# Patient Record
Sex: FEMALE | Race: ASIAN | NOT HISPANIC OR LATINO | Employment: FULL TIME | URBAN - METROPOLITAN AREA
[De-identification: names, ages, dates, MRNs, and addresses within clinical notes are randomized per-mention and may not be internally consistent; named-entity substitution may affect disease eponyms.]

---

## 2021-03-30 LAB
EXTERNAL CHLAMYDIA RESULT: NEGATIVE
EXTERNAL HIV SCREEN: NORMAL
HCV AB SER-ACNC: 0.2
N GONORRHOEA RRNA SPEC QL PROBE: NEGATIVE

## 2021-06-10 PROBLEM — N93.8 DUB (DYSFUNCTIONAL UTERINE BLEEDING): Status: ACTIVE | Noted: 2021-06-10

## 2021-06-11 ENCOUNTER — OFFICE VISIT (OUTPATIENT)
Dept: OBGYN CLINIC | Facility: CLINIC | Age: 36
End: 2021-06-11
Payer: COMMERCIAL

## 2021-06-11 VITALS
SYSTOLIC BLOOD PRESSURE: 110 MMHG | WEIGHT: 143 LBS | BODY MASS INDEX: 26.31 KG/M2 | HEIGHT: 62 IN | DIASTOLIC BLOOD PRESSURE: 68 MMHG

## 2021-06-11 DIAGNOSIS — N93.8 DUB (DYSFUNCTIONAL UTERINE BLEEDING): Primary | ICD-10-CM

## 2021-06-11 DIAGNOSIS — R53.83 FATIGUE, UNSPECIFIED TYPE: ICD-10-CM

## 2021-06-11 PROCEDURE — 99204 OFFICE O/P NEW MOD 45 MIN: CPT | Performed by: OBSTETRICS & GYNECOLOGY

## 2021-06-11 RX ORDER — LEVOTHYROXINE SODIUM 50 UG/1
2 CAPSULE ORAL DAILY
COMMUNITY

## 2021-06-11 NOTE — LETTER
June 17, 2021     Yajaira Doddridge,   [address] [facility]  [address]    Patient: Most Estefani Royal   YOB: 1985   Date of Visit: 6/11/2021       Dear Dr Mary Adrian:    Thank you for referring Most Estefani Royal to me for evaluation  Below are my notes for this consultation  If you have questions, please do not hesitate to call me  I look forward to following your patient along with you  Sincerely,        Torres Briscoe MD        CC: No Recipients  Torres Briscoe MD  6/17/2021  3:15 PM  Addendum  [id number]   [address] [facility] [address], [facility], [facility] [address]    Assessment/Plan:  1  DUB (dysfunctional uterine bleeding)  Comments:  Long history per pt and  starting after delivery of daughter in 2010  See HPI for details  Assessment & Plan:  I asked patient and  if their prior doctor had every discussed PCOS or done an ultrasound  They state many ultrasounds done and always normal   They are not familiar with PCOS  I briefly discussed PCOS and that this may be what she has  I would like to order some labs, but do not want to repeat a lot done by PCP  Will get PCP labs to review (release signed today)  Order given for pelvic ultrasound - recommend to do this next week  Once I see prior labs done I will likely order more  I recommend they return in 3-4 weeks to discuss findings and make a plan to manage periods and hopefully help them conceive in the future  They are appreciative of time spent and agree to plan  Orders:  -     US pelvis complete w transvaginal; Future; Expected date: 07/11/2021    2  Fatigue, unspecified type  Comments:  Patient with intermittent episodes of physical fatigue and sickness a couple times a week  Feels better after resting 1-2 days  Both her and  deny mental issues  A/P:   Will review labs for causes  She is only mildly anemic on ER labs  Subjective:   Most Estefani Royal is a 30 y o   No obstetric history on file  female  CC: follow up ER visit      HPI:    and her  present for f/u from ER for bleeding issues and discuss periods and interest in future pregnancy  They are both from [place], moving here a few months ago, and preferred language is [place]   speaks a little [place], while her , Preet Tomas, is more conversational   I offered them the use of an official  and they agreed  (I forgot to record the translators number, most of the conversation occurred with the )  Review of history through pt and  is that  had an uncomplicated vaginal delivery about 10 years ago  About 1-2 years after her delivery her periods became very irregular  Initially she was treated with OCPs, which helped normalize them but they were irregular after stopping them  Lab work done showed an abnormal thyroid testing and she was started on thyroid medicine about 6-7 years ago for hypothyroid  Even after treatment for her thyroid she continued to have irregular periods, sometimes missing many months and then getting them very heavy  Patient  states their gyn gave them a standing script for norethindrone 5mg  If  did not have a period for 3 months she was supposed to take 2 pills morning, afternoon and evening for 5 days and then would get a period  Alternatively if she had a period that was very heavy and wouldn't stop, she could take the pills once a day for 21 days and this would help the bleeding   states over the last year she has used the medication a couple times either to bring on a period or slow a heavy one  More missed periods than heavy periods per his recall  They have recently moved here and established PCP care with Dr Corrinne Shows  Apparently they recently had blood work done and have plans to f/u with Dr Enid Hogan to discuss the blood work    They feel her thyroid testing was normal but there was a question of "thalassemia" and they are to f/u in a couple months  I do not have access to this blood work today  Most's periods over the last 6 months have been as follows,  No period Jan or Feb, then March was a normal spontaneous period  April her period started 4/16/2021 light for two days and then very heavy  After not stopping she started the norethindrone 5mg PO on 4/29/2021 that she had from her gyn in [place]  She took it for 7 days but developed headache and little energy, so although her bleeding had stopped, she stopped taking the medication and the bleeding started very heavy again around 5/7/2021  She presented to the [facility] ER 5/9/2021 for the heavy bleeding  I am able to review the notes from the ER in [facility]  She was seen 5/9/2021 for vaginal bleeding  The history was similar to above and pt reported for heavy bleeding, feeling fatigued and dizzy  No abdominal pain or other symptoms  Labs showed stable hgb (10 4g/dl)  I did not see an ultrasound done  Her bleeding was not significant in the ER, so they were discharged home with at prescription for norethindrone EE 1 5/30mg-mcg  And told to f/u with gyn  I confirmed this with patient and   He states she did not take the medication from the ER and her menses stopped spontaneously  She has since had another period starting 6/2/2021 stopped 6/4/2021,  very light for her  She is currently not taking any progesterone or estrogen  Just her thyroid medication  They indicate that they are interested in having another child but this is difficult with irregular periods  Other than the hypothyroid issue, they state no other health problems but state occasionally Most has episodes where she feels sick, tired for a few days, needs to rest and then feels better    I questioned them both and they say this is strictly physical, not emotional or mental       Gyn History  Patient's last menstrual period was 2021  Last pap smear: Not on file    She  reports being sexually active and has had partner(s) who are Male  She reports using the following method of birth control/protection: None  OB History      Past Medical History:  No date: Hypothyroid     No past surgical history on file  Social History     Tobacco Use    Smoking status: Never Smoker    Smokeless tobacco: Never Used   Vaping Use    Vaping Use: Never used   Substance Use Topics    Alcohol use: Never    Drug use: Never          Current Outpatient Medications:     Levothyroxine Sodium 50 MCG CAPS, Take 2 capsules by mouth daily, Disp: , Rfl:     norethindrone (AYGESTIN) 5 mg tablet, Take 5 mg by mouth daily, Disp: , Rfl:  - not currently taking    She has No Known Allergies       ROS: Review of Systems   Constitutional: Positive for fatigue (intermittent)  Negative for chills and fever  Respiratory: Negative  Cardiovascular: Negative  Gastrointestinal: Negative  Genitourinary: Positive for menstrual problem (irregular and sometimes heavy prolonged bleeding)  Negative for pelvic pain  Psychiatric/Behavioral: Negative  Objective:  /68   Ht 5' 2" (1 575 m)   Wt 64 9 kg (143 lb)   LMP 2021 Comment: last menses lasted till 2021  Very light  Breastfeeding No   BMI 26 16 kg/m²      Physical Exam  Constitutional:       Appearance: Normal appearance  Neurological:      Mental Status: She is alert and oriented to person, place, and time     Psychiatric:         Behavior: Behavior normal

## 2021-06-11 NOTE — PROGRESS NOTES
[address], [place] [address]    Assessment/Plan:  1  DUB (dysfunctional uterine bleeding)  Comments:  Long history per pt and  starting after delivery of daughter in 2010  See HPI for details  Assessment & Plan:  I asked patient and  if their prior doctor had every discussed PCOS or done an ultrasound  They state many ultrasounds done and always normal   They are not familiar with PCOS  I briefly discussed PCOS and that this may be what she has  I would like to order some labs, but do not want to repeat a lot done by PCP  Will get PCP labs to review (release signed today)  Order given for pelvic ultrasound - recommend to do this next week  Once I see prior labs done I will likely order more  I recommend they return in 3-4 weeks to discuss findings and make a plan to manage periods and hopefully help them conceive in the future  They are appreciative of time spent and agree to plan  Orders:  -     US pelvis complete w transvaginal; Future; Expected date: 07/11/2021    2  Fatigue, unspecified type  Comments:  Patient with intermittent episodes of physical fatigue and sickness a couple times a week  Feels better after resting 1-2 days  Both her and  deny mental issues  A/P:   Will review labs for causes  She is only mildly anemic on ER labs  Subjective:   Most Carmela Night is a 30 y o  No obstetric history on file  female  CC: follow up ER visit      HPI:    and her  present for f/u from ER for bleeding issues and discuss periods and interest in future pregnancy  They are both from [place], moving here a few months ago, and preferred language is [place]  Most speaks a little [place], while her , Kike Olmstead, is more conversational   I offered them the use of an official  and they agreed    (I forgot to record the translators number, most of the conversation occurred with the )  Review of history through pt and  is that Most had an uncomplicated vaginal delivery about 10 years ago  About 1-2 years after her delivery her periods became very irregular  Initially she was treated with OCPs, which helped normalize them but they were irregular after stopping them  Lab work done showed an abnormal thyroid testing and she was started on thyroid medicine about 6-7 years ago for hypothyroid  Even after treatment for her thyroid she continued to have irregular periods, sometimes missing many months and then getting them very heavy  Patient  states their gyn gave them a standing script for norethindrone 5mg  If Most did not have a period for 3 months she was supposed to take 2 pills morning, afternoon and evening for 5 days and then would get a period  Alternatively if she had a period that was very heavy and wouldn't stop, she could take the pills once a day for 21 days and this would help the bleeding   states over the last year she has used the medication a couple times either to bring on a period or slow a heavy one  More missed periods than heavy periods per his recall  They have recently moved here and established PCP care with Dr Rehan Dixon  Apparently they recently had blood work done and have plans to f/u with Dr Karl Stephenson to discuss the blood work  They feel her thyroid testing was normal but there was a question of "thalassemia" and they are to f/u in a couple months  I do not have access to this blood work today  Most's periods over the last 6 months have been as follows,  No period Jan or Feb, then March was a normal spontaneous period  April her period started 4/16/2021 light for two days and then very heavy  After not stopping she started the norethindrone 5mg PO on 4/29/2021 that she had from her gyn in [place]    She took it for 7 days but developed headache and little energy, so although her bleeding had stopped, she stopped taking the medication and the bleeding started very heavy again around 2021  She presented to the [facility] ER 2021 for the heavy bleeding  I am able to review the notes from the ER in [facility]  She was seen 2021 for vaginal bleeding  The history was similar to above and pt reported for heavy bleeding, feeling fatigued and dizzy  No abdominal pain or other symptoms  Labs showed stable hgb (10 4g/dl)  I did not see an ultrasound done  Her bleeding was not significant in the ER, so they were discharged home with at prescription for norethindrone EE 1 5/30mg-mcg  And told to f/u with gyn  I confirmed this with patient and   He states she did not take the medication from the ER and her menses stopped spontaneously  She has since had another period starting 2021 stopped 2021,  very light for her  She is currently not taking any progesterone or estrogen  Just her thyroid medication  They indicate that they are interested in having another child but this is difficult with irregular periods  Other than the hypothyroid issue, they state no other health problems but state occasionally Most has episodes where she feels sick, tired for a few days, needs to rest and then feels better  I questioned them both and they say this is strictly physical, not emotional or mental       Gyn History  Patient's last menstrual period was 2021  Last pap smear: Not on file    She  reports being sexually active and has had partner(s) who are Male  She reports using the following method of birth control/protection: None  OB History      Past Medical History:  No date: Hypothyroid     No past surgical history on file       Social History     Tobacco Use    Smoking status: Never Smoker    Smokeless tobacco: Never Used   Vaping Use    Vaping Use: Never used   Substance Use Topics    Alcohol use: Never    Drug use: Never          Current Outpatient Medications:     Levothyroxine Sodium 50 MCG CAPS, Take 2 capsules by mouth daily, Disp: , Rfl:     norethindrone (AYGESTIN) 5 mg tablet, Take 5 mg by mouth daily, Disp: , Rfl:  - not currently taking    She has No Known Allergies       ROS: Review of Systems   Constitutional: Positive for fatigue (intermittent)  Negative for chills and fever  Respiratory: Negative  Cardiovascular: Negative  Gastrointestinal: Negative  Genitourinary: Positive for menstrual problem (irregular and sometimes heavy prolonged bleeding)  Negative for pelvic pain  Psychiatric/Behavioral: Negative  Objective:  /68   Ht 5' 2" (1 575 m)   Wt 64 9 kg (143 lb)   LMP 06/02/2021 Comment: last menses lasted till 6/4/2021  Very light  Breastfeeding No   BMI 26 16 kg/m²      Physical Exam  Constitutional:       Appearance: Normal appearance  Neurological:      Mental Status: She is alert and oriented to person, place, and time     Psychiatric:         Behavior: Behavior normal

## 2021-06-17 PROBLEM — R53.83 FATIGUE: Status: ACTIVE | Noted: 2021-06-17

## 2021-06-17 NOTE — ASSESSMENT & PLAN NOTE
I asked patient and  if their prior doctor had every discussed PCOS or done an ultrasound  They state many ultrasounds done and always normal   They are not familiar with PCOS  I briefly discussed PCOS and that this may be what she has  I would like to order some labs, but do not want to repeat a lot done by PCP  Will get PCP labs to review (release signed today)  Order given for pelvic ultrasound - recommend to do this next week  Once I see prior labs done I will likely order more  I recommend they return in 3-4 weeks to discuss findings and make a plan to manage periods and hopefully help them conceive in the future  They are appreciative of time spent and agree to plan

## 2021-07-09 ENCOUNTER — OFFICE VISIT (OUTPATIENT)
Dept: OBGYN CLINIC | Facility: CLINIC | Age: 36
End: 2021-07-09
Payer: COMMERCIAL

## 2021-07-09 VITALS
BODY MASS INDEX: 26.76 KG/M2 | HEIGHT: 62 IN | WEIGHT: 145.4 LBS | DIASTOLIC BLOOD PRESSURE: 64 MMHG | SYSTOLIC BLOOD PRESSURE: 102 MMHG

## 2021-07-09 DIAGNOSIS — N97.9 INFERTILITY, FEMALE: ICD-10-CM

## 2021-07-09 DIAGNOSIS — N93.8 DUB (DYSFUNCTIONAL UTERINE BLEEDING): Primary | ICD-10-CM

## 2021-07-09 DIAGNOSIS — N92.6 IRREGULAR PERIODS: ICD-10-CM

## 2021-07-09 PROCEDURE — 99214 OFFICE O/P EST MOD 30 MIN: CPT | Performed by: OBSTETRICS & GYNECOLOGY

## 2021-07-09 RX ORDER — MEDROXYPROGESTERONE ACETATE 10 MG/1
10 TABLET ORAL DAILY
Qty: 7 TABLET | Refills: 0 | Status: SHIPPED | OUTPATIENT
Start: 2021-07-09 | End: 2021-09-26

## 2021-07-09 NOTE — PATIENT INSTRUCTIONS
- do lab work before period - HgA1C, progesterone, testosterone, prolacin, DHEA-S, 17-hydroxyprogeserone, estradiol, HCG   - call office to let me know blood work is done for me to review     - I will let you know if okay to take Provera to bring on period    - take medication for 7 days, should get a period within 2 weeks of finishing the medication   - Day 3 of period get other blood work - [facility]   - return to review

## 2021-07-09 NOTE — PROGRESS NOTES
[address]    Assessment/Plan:  Most is a 30y o  year old  who presents for u/s and lab f/u - presents with her   He helped to translate and [place]  León Morfin ([id number]) Terrell was used as well     1  DUB (dysfunctional uterine bleeding)  Assessment & Plan:  Pt with long h/o infrequent periods and heavy when she has them  Previously treated in [place] with intermittent norethindrone  Patient and  frustrated and interested in conceiving  Previously reviewed history in detail  Order given last visit for pelvic u/s - done 2021 completely normal   Also labs ordered by PCP earlier in year obtained  3/2021 CBC, CMP, lipids normal   TSH slightly low but FT4 normal - pt on levothyroxine by PCP  I still feel description of bleeding issues could be c/w PCOS  Recommend labs to help see if PCOS can be ruled in  Also to evaluate for other hormonal issues  Orders given for labs  No menses since 2021 (pt and  states usually every 3 months or so)  Will do all labs except FSH, LH now  If hcg negative, will take Provera to bring on period and we will then do day 3 FSH, LH  Pt and  to return to discuss results and possibly narrow diagnosis  Reviewed plan in detail with  who expresses understanding  Asked him to wait to start Provera challenge until I have reviewed labs  He understands  2  Infertility, female  Assessment & Plan:  Pt and  trying to conceive for years  Periods very erratic  H/o  previously  Discussed infertility likely due to anovulation vs oligovulation  Recom labs to try to determine cause  After labs done will discuss ovulation induction options  Pt and  agree  3  Irregular periods  -     Estradiol; Future; Expected date:   -     Follicle stimulating hormone;  Future; Expected date: 10/09/2021  -     Luteinizing hormone; Future; Expected date: 10/09/2021  -     Progesterone; Future; Expected date: 10/09/2021  -     Prolactin; Future; Expected date: 10/09/2021  -     Testosterone, free, total; Future; Expected date: 10/09/2021  -     17-Hydroxyprogesterone; Future; Expected date: 10/09/2021  -     DHEA-sulfate; Future; Expected date: 10/09/2021  -     Hemoglobin A1c (w/out EAG); Future  -     Estradiol  -     Follicle stimulating hormone  -     Luteinizing hormone  -     Progesterone  -     Prolactin  -     Testosterone, free, total  -     17-Hydroxyprogesterone  -     DHEA-sulfate  -     Hemoglobin A1c (w/out EAG)  -     hCG, quantitative; Future  -     hCG, quantitative  -     medroxyPROGESTERone (PROVERA) 10 mg tablet; Take 1 tablet (10 mg total) by mouth daily      Next Exam: 3-4 weeks    Subjective:     CC: Pelvic exam    HPI: Most Loulou Abraham is a 30 y  o  who presents to f/u u/s and labs  Pt and  for visit  They state no menses or bleeding since prior to last visit 6/2/2021  Reviewed u/s normal - 6/18/2021  uterus 7 5 x 3 4 x 4 3cm, no fibroids, EMS 9mm, ovaries normal    states it has always been normal when checked in [place] before  I explained since we did not have those records it was important to repeat and confirm here  Reviewed labs from 3/2021 by PCP - CBC, CMP, lipids normal, TSH slightly low but normal FT4  I do not manage hypothyroid and this may be considered therapeutic, reviewed PCP to continue to manage  Discussed we need to do additional labs to evaluate for ovulatory dysfunction and PCOS  The following portions of the patient's history were reviewed and updated as appropriate: allergies, current medications, past family history, past medical history, obstetric history, gynecologic history, past social history, past surgical history and problem list     ROS: Review of Systems   Constitutional: Negative  Gastrointestinal: Negative      Genitourinary: Positive for menstrual problem (irregular periods)  Psychiatric/Behavioral: Negative  Current Outpatient Medications:     Levothyroxine Sodium 50 MCG CAPS, Take 2 capsules by mouth daily, Disp: , Rfl:       Objective:  /64 (BP Location: Left arm, Patient Position: Sitting, Cuff Size: Standard)   Ht 5' 2" (1 575 m)   Wt 66 kg (145 lb 6 4 oz)   LMP 06/02/2021 (Exact Date)   Breastfeeding No   BMI 26 59 kg/m²      Physical Exam  Constitutional:       Appearance: Normal appearance  Neurological:      Mental Status: She is alert and oriented to person, place, and time     Psychiatric:         Behavior: Behavior normal

## 2021-07-09 NOTE — ASSESSMENT & PLAN NOTE
Pt and  trying to conceive for years  Periods very erratic  H/o  previously  Discussed infertility likely due to anovulation vs oligovulation  Recom labs to try to determine cause  After labs done will discuss ovulation induction options  Pt and  agree

## 2021-07-09 NOTE — ASSESSMENT & PLAN NOTE
Pt with long h/o infrequent periods and heavy when she has them  Previously treated in [place] with intermittent norethindrone  Patient and  frustrated and interested in conceiving  Previously reviewed history in detail  Order given last visit for pelvic u/s - done 6/18/2021 completely normal   Also labs ordered by PCP earlier in year obtained  3/2021 CBC, CMP, lipids normal   TSH slightly low but FT4 normal - pt on levothyroxine by PCP  I still feel description of bleeding issues could be c/w PCOS  Recommend labs to help see if PCOS can be ruled in  Also to evaluate for other hormonal issues  Orders given for labs  No menses since 6/2/2021 (pt and  states usually every 3 months or so)  Will do all labs except FSH, LH now  If hcg negative, will take Provera to bring on period and we will then do day 3 FSH, LH  Pt and  to return to discuss results and possibly narrow diagnosis  Reviewed plan in detail with  who expresses understanding  Asked him to wait to start Provera challenge until I have reviewed labs  He understands

## 2021-07-19 ENCOUNTER — TELEPHONE (OUTPATIENT)
Dept: OBGYN CLINIC | Facility: CLINIC | Age: 36
End: 2021-07-19

## 2021-07-19 DIAGNOSIS — E03.9 ACQUIRED HYPOTHYROIDISM: ICD-10-CM

## 2021-07-19 DIAGNOSIS — N97.9 INFERTILITY, FEMALE: Primary | ICD-10-CM

## 2021-07-19 NOTE — TELEPHONE ENCOUNTER
Spouse states Dr Leatha Holt asked to be notified when he took his wife for bloodwork  He would like the message forwarded to DR Leatha Holt  Advised will forward as he requested

## 2021-07-20 LAB — B-HCG SERPL-ACNC: <3 MIU/ML

## 2021-07-20 NOTE — TELEPHONE ENCOUNTER
Most results received  Please call pt's  and recom take the 7 days of Provera, he was given script at visit  She should have a period within 1-2 weeks of finishing the course of Provera  Then on day 3 of her period, she should have the [facility], LH repeated (I placed order again lab did this time by mistake,  has paper order for [facility] and  as well)  Keep appointment with me in August to review all results

## 2021-07-22 PROBLEM — E28.2 PCOS (POLYCYSTIC OVARIAN SYNDROME): Status: ACTIVE | Noted: 2021-07-22

## 2021-07-22 LAB
17OHP SERPL-MCNC: 40 NG/DL
DHEA-S SERPL-MCNC: 129 MCG/DL (ref 23–266)
ESTRADIOL SERPL-MCNC: 40 PG/ML
FSH SERPL-ACNC: 6.5 MIU/ML
HBA1C MFR BLD: 5.5 % OF TOTAL HGB
LH SERPL-ACNC: 11 MIU/ML
PROGEST SERPL-MCNC: 0.5 NG/ML
PROLACTIN SERPL-MCNC: 8.4 NG/ML
TESTOST FREE SERPL-MCNC: 10 PG/ML (ref 0.1–6.4)
TESTOST SERPL-MCNC: 60 NG/DL (ref 2–45)

## 2021-07-29 NOTE — TELEPHONE ENCOUNTER
It's fine to keep visit in August, even if she hasn't had a period yet  The results I've received so far, indicate PCOS, which I have mentioned to them in the office before  At the visit next week we can talk about plans to help conceive and make a plan  But if he wishes to push it a couple weeks further, than that is fine

## 2021-08-06 ENCOUNTER — OFFICE VISIT (OUTPATIENT)
Dept: OBGYN CLINIC | Facility: CLINIC | Age: 36
End: 2021-08-06
Payer: COMMERCIAL

## 2021-08-06 VITALS
WEIGHT: 143.6 LBS | HEIGHT: 62 IN | DIASTOLIC BLOOD PRESSURE: 58 MMHG | SYSTOLIC BLOOD PRESSURE: 100 MMHG | BODY MASS INDEX: 26.43 KG/M2

## 2021-08-06 DIAGNOSIS — N97.9 INFERTILITY, FEMALE: ICD-10-CM

## 2021-08-06 DIAGNOSIS — E28.2 PCOS (POLYCYSTIC OVARIAN SYNDROME): Primary | ICD-10-CM

## 2021-08-06 PROCEDURE — 99214 OFFICE O/P EST MOD 30 MIN: CPT | Performed by: OBSTETRICS & GYNECOLOGY

## 2021-08-06 RX ORDER — LETROZOLE 2.5 MG/1
2.5 TABLET, FILM COATED ORAL DAILY
Qty: 5 TABLET | Refills: 3 | Status: SHIPPED | OUTPATIENT
Start: 2021-08-06 | End: 2021-08-06 | Stop reason: SDUPTHER

## 2021-08-06 RX ORDER — LETROZOLE 2.5 MG/1
2.5 TABLET, FILM COATED ORAL DAILY
Qty: 5 TABLET | Refills: 3 | Status: SHIPPED | OUTPATIENT
Start: 2021-08-06 | End: 2021-08-18 | Stop reason: SDUPTHER

## 2021-08-06 NOTE — PROGRESS NOTES
[address], [place] [address]    Assessment/Plan:  Most is a 30y o  year old  who presents for follow up to discuss results  1  PCOS (polycystic ovarian syndrome)  Assessment & Plan:  Patient with long h/o oligomenorrhea and now labs done recently with elevated Testosterone  This is consistent with dx of PCOS  Reviewed diagnosis of PCOS today by meeting 2 of the 3 following -  irregular periods/oligomenorrhea, hyperandrongenism, or polycystic appearance of ovaries on ultrasound  Discussed unclear cause of PCOS, but involves hormone dysregulation that usually causes irregular period and multiple other symptoms  Increase risk of diabetes and high cholesterol likely life long  Management of PCOS involves symptom management, protection of uterine lining and efforts to reverse PCOS and prevent long term risks  This can be done in a number of ways, including weight loss, treatment with OCPs, metformin or spironolactone - to target symptoms  Infertility is more common in patient's with PCOS, due to decreases in rate/frequency of ovulation, but these can often be managed and most women with PCOS have successful pregnancies  Orders:  -     letrozole (FEMARA) 2 5 mg tablet; Take 1 tablet (2 5 mg total) by mouth daily Please take 1 tablet daily on day 3-7 of cycle  2  Infertility, female  Assessment & Plan:  Mid cycle labs done show elevated testosterone with help in dx of PCOS  Discussed this is likely cause of inability to conceive  We are still waiting on Day 3 labs [facility], ) - pt to do after withdrawal bleed - she is currently on Provera course  Given infertility is likely from PCOS, I feel we can more forward with ovulation induction this cycle pt will have with Provera (assuming day 3 labs are normal)  Discussed trial of ovulation induction with letrozole    (Although Clomid is FDA approved for ovulation indction, ACOG now recommends using letrozole for PCOS, athough it is off label use )  Reviewed risks, benefits and contraindications  When pt gets withdrawal bleed on current Provera course, will start Letrozole daily on days 3-7 of cycle  Check progesterone day 23 to confirm ovulation adequate  I will send progesterone lab to [address] [place] [address] after day 3 labs drawn to avoid having it drawn at wrong time  Will continue w/ at least 3 cycles before considering referral to [facility] (although pt may see [facility] at any time)  Orders for day 3 FSH, LH and TSH already sent to lab (will confirm TSH in normal range for conception) - copies of these provided to  and patient  They express understanding of plan and understand we will communicate re results via phone in the coming month  Written instructions given and reviewed in detail of plan  Orders:  -     letrozole (FEMARA) 2 5 mg tablet; Take 1 tablet (2 5 mg total) by mouth daily Please take 1 tablet daily on day 3-7 of cycle  Next Exam: as needed    Subjective:     CC: follow up results    HPI: Most Buddy Lira is a 30 y  o  who presents for follow up of results   #[id number] ([facility]) used for visit  Pt and  present to review recent lab results and diagnosis and discuss conception  They first presented in June to discuss menstrual issues and unable to conceive spontaneously  Patient reports she is on day 4 of 7 day Provera course to induce menses  She states it makes her feel lethargic and tired  Otherwise she is fine  The following portions of the patient's history were reviewed and updated as appropriate: allergies, current medications, past family history, past medical history, obstetric history, gynecologic history, past social history, past surgical history and problem list     ROS: Review of Systems   Constitutional: Positive for fatigue  Gastrointestinal: Negative  Genitourinary: Negative  Psychiatric/Behavioral: Negative            Current Outpatient Medications:     Levothyroxine Sodium 50 MCG CAPS, Take 2 capsules by mouth daily, Disp: , Rfl:     medroxyPROGESTERone (PROVERA) 10 mg tablet, Take 1 tablet (10 mg total) by mouth daily, Disp: 7 tablet, Rfl: 0      Objective:  /58   Ht 5' 2 25" (1 581 m)   Wt 65 1 kg (143 lb 9 6 oz)   LMP 06/02/2021 (Exact Date)   Breastfeeding No   BMI 26 05 kg/m²      Physical Exam  Constitutional:       Appearance: Normal appearance  Neurological:      Mental Status: She is alert and oriented to person, place, and time     Psychiatric:         Behavior: Behavior normal

## 2021-08-06 NOTE — PATIENT INSTRUCTIONS
As previously planned - Day 3 of cycle get blood work checked - FSH, LH, TSH  Goal for TSH while trying to conceive is 0 1-2  5mIU/L   - if TSH is out of this range, please contact Dr Tico Dutton to adjust levothyroxine  - if she has questions, please have her contact me  [address] (letrozole)  [address] is a drug that is used to help some women increase their ovulation (release of an egg from the ovary)  It is taken on Days 3 through 7 of the cycle  Day 1 is the first day of the menstrual flow  [address] is generally well tolerated, but it can have some side effects, which include hot flashes, fatigue, and dizziness  A rare risk of [address] is liver dysfunction  You should notify your doctor if you have liver disease  Ovulation induction generally has been associated with twinning (having twins); although, this has not been seen in studies with [address]  [address] in not FDA approved for ovulation induction; this is considered off-label use  INSTRUCTIONS:  Lechuga Factor should be taken daily on Days 3 through 7 of the cycle   Ovulation normally occurs between 5 to 10 days after the last dose   Attempt conception every other day starting on Day 13 (so on Day 13, Day 15,   Day 19, Day 21)   An ovulation predictor kit can be used to document ovulation   A serum progesterone level is drawn on Day 23 to assess the adequacy of the ovulation and to determine if a higher dose of [address] is needed   After 3 cycles of FEMARA-induced ovulation, if not successful, a referral to a reproductive endocrinologist is appropriate

## 2021-08-07 NOTE — ASSESSMENT & PLAN NOTE
Patient with long h/o oligomenorrhea and now labs done recently with elevated Testosterone  This is consistent with dx of PCOS  Reviewed diagnosis of PCOS today by meeting 2 of the 3 following -  irregular periods/oligomenorrhea, hyperandrongenism, or polycystic appearance of ovaries on ultrasound  Discussed unclear cause of PCOS, but involves hormone dysregulation that usually causes irregular period and multiple other symptoms  Increase risk of diabetes and high cholesterol likely life long  Management of PCOS involves symptom management, protection of uterine lining and efforts to reverse PCOS and prevent long term risks  This can be done in a number of ways, including weight loss, treatment with OCPs, metformin or spironolactone - to target symptoms  Infertility is more common in patient's with PCOS, due to decreases in rate/frequency of ovulation, but these can often be managed and most women with PCOS have successful pregnancies

## 2021-08-07 NOTE — ASSESSMENT & PLAN NOTE
Mid cycle labs done show elevated testosterone with help in dx of PCOS  Discussed this is likely cause of inability to conceive  We are still waiting on Day 3 labs [facility], ) - pt to do after withdrawal bleed - she is currently on Provera course  Given infertility is likely from PCOS, I feel we can more forward with ovulation induction this cycle pt will have with Provera (assuming day 3 labs are normal)  Discussed trial of ovulation induction with letrozole  (Although Clomid is FDA approved for ovulation indction, ACOG now recommends using letrozole for PCOS, athough it is off label use )  Reviewed risks, benefits and contraindications  When pt gets withdrawal bleed on current Provera course, will start Letrozole daily on days 3-7 of cycle  Check progesterone day 23 to confirm ovulation adequate  I will send progesterone lab to [address] [place] [address] after day 3 labs drawn to avoid having it drawn at wrong time  Will continue w/ at least 3 cycles before considering referral to [facility] (although pt may see [facility] at any time)  Orders for day 3 FSH, LH and TSH already sent to lab (will confirm TSH in normal range for conception) - copies of these provided to  and patient  They express understanding of plan and understand we will communicate re results via phone in the coming month  Written instructions given and reviewed in detail of plan

## 2021-08-18 ENCOUNTER — TELEPHONE (OUTPATIENT)
Dept: OBGYN CLINIC | Facility: CLINIC | Age: 36
End: 2021-08-18

## 2021-08-18 DIAGNOSIS — E28.2 PCOS (POLYCYSTIC OVARIAN SYNDROME): ICD-10-CM

## 2021-08-18 DIAGNOSIS — N97.9 INFERTILITY, FEMALE: ICD-10-CM

## 2021-08-18 RX ORDER — LETROZOLE 2.5 MG/1
2.5 TABLET, FILM COATED ORAL DAILY
Qty: 5 TABLET | Refills: 3 | Status: SHIPPED | OUTPATIENT
Start: 2021-08-18

## 2021-08-18 NOTE — TELEPHONE ENCOUNTER
Pt was seen on 8/6/21 and prescribed letrozole  Pt's  called informing the pharmacy does not have script  Spoke with  Dorothy Reno at [facility] (on file) and informed  rx for letrozole was inadvertently cancelled  Pt started with her period today and needs to start medication on day 3  Please resend to [facility] on file

## 2021-08-19 LAB
FSH SERPL-ACNC: 5 MIU/ML
LH SERPL-ACNC: 7 MIU/ML
TSH SERPL-ACNC: 1.61 MIU/L

## 2021-09-15 ENCOUNTER — TELEPHONE (OUTPATIENT)
Dept: OBGYN CLINIC | Facility: CLINIC | Age: 36
End: 2021-09-15

## 2021-09-15 DIAGNOSIS — N97.9 INFERTILITY, FEMALE: Primary | ICD-10-CM

## 2021-09-15 DIAGNOSIS — N91.2 AMENORRHEA: ICD-10-CM

## 2021-09-15 NOTE — TELEPHONE ENCOUNTER
I was reviewing patient's chart  She was supposed to start Letrozole for ovulation induction in August and check progesterone level on day 23 of cycle  I was supposed to send progesterone level order to lab when I got her lab results on 8/19/2021, and forgot  I sent progesterone level to lab [facility]     Nurses, Please call patient's  and see which day of her cycle she is (I think she is around day 28)  If she is after day 23 but hasn't gotten her period yet, please encourage them to go to lab for progesterone  I am very sorry about my omission in ordering

## 2021-09-17 NOTE — TELEPHONE ENCOUNTER
Spoke with pt's  and he informed Most started with a period on 8/17/21 and started Letrozole on day 3 (8/19/21, and did not have progesterone level done on day 23  Pt's  informed Most has not had a period since 8/17/21  Spoke with Dr Mónica Ryan and if pt does not have a period by 9/21/21, check a HPT, if negative call back for further direction  Forwarded to Dr Mónica Ryan to advise plan

## 2021-09-18 NOTE — TELEPHONE ENCOUNTER
Recommend pt check HCG if no period by next week  If HCG neg, will order Provera to bring on period (please ask Dr Kari Fagan to order as I am out of town, she is aware)  After menses on Provera, recommend  taking Letrazole again as prescribed and check progesterone level on day 23, order already sent

## 2021-09-20 NOTE — TELEPHONE ENCOUNTER
Spoke with Most spouse  She has not started to bleed as of today  He will call back Wednesday with update and understands if no bleed by Wednesday am, she will need labwork to confirm pregnancy status

## 2021-09-22 NOTE — TELEPHONE ENCOUNTER
Spoke with spouse, Most has not started with menses as of this morning  He is in agreement to take her for bloodwork but he is unable to have it done until Friday   Order generated, transmitted to [address] [place] [address]

## 2021-09-26 LAB — HCG INTACT+B SERPL-ACNC: <1 MIU/ML

## 2021-09-26 RX ORDER — MEDROXYPROGESTERONE ACETATE 10 MG/1
10 TABLET ORAL DAILY
Qty: 7 TABLET | Refills: 0 | Status: SHIPPED | OUTPATIENT
Start: 2021-09-26

## 2021-09-27 NOTE — TELEPHONE ENCOUNTER
Called and spoke with pt's   He verbalized understanding not to  the medicine  He states they will back back from [place] 01/26/22 and will call when she ready to start the process again

## 2021-09-27 NOTE — TELEPHONE ENCOUNTER
Patient's  l/m on Sunday at 04:22 pm stating that his wife started her period so therefore she does not need Provera  However, he stated that him and his wife will be visiting [place] next month for 3 months  He said that his wife will restart the process when she returns  Will forward message to Dr Angely Solis

## 2021-09-27 NOTE — TELEPHONE ENCOUNTER
Understand  I already sent Provera to pharmacy  Please just instruct them not to   They can let us know when they are ready to start again

## 2024-01-05 ENCOUNTER — TELEPHONE (OUTPATIENT)
Dept: OBGYN CLINIC | Facility: CLINIC | Age: 39
End: 2024-01-05

## 2024-01-05 NOTE — TELEPHONE ENCOUNTER
Pt's  left a message requesting a call back.  Attempted to return call, voicemail received, left a message for Most's  (Sam ) to call back.

## 2024-01-31 NOTE — PROGRESS NOTES
Assessment/Plan:    Menorrhagia with irregular cycle  37 yo  LMP 23 last seen 2021 at [facility] with DUB, menorrhagia, secondary infertility and PCOS.   Presents today with  (Most agreed to his  role) with similar complaints.   Irregular menses, heavy flow when occurs.   Has been seen by gyn in [facility] with cyclic progesterone therapy and offering of OCPs which they have declined.   Currently most concerned with cycle, menorrhagia, fatigue and anemia. Last blood work from 2023 reviewed.     Normal u/s reviewed from , normal day 3 LH FSH. PCOS labs. Failed letrozole attempt for conception in .     Will check TSH, CBC, HCG and start provera 5 x 5 days for cycle and then OCPs to follow.   R&B of OCPs reviewed, no contraindication.  IV iron may be indicated pending results.   Will recheck in 3 months with annual exam (overdue pap) and then refer to RE if still interested in pregnancy.     At the end of the consultation, Most requested exam of external vaginal skin tags. Will recheck in May at [facility] check.       Diagnoses and all orders for this visit:    Menorrhagia with irregular cycle  -     TSH + Free T4; Future  -     CBC and differential; Future  -     hCG, quantitative; Future  -     TSH + Free T4  -     CBC and differential  -     hCG, quantitative    Infertility, female  -     TSH + Free T4; Future  -     CBC and differential; Future  -     hCG, quantitative; Future  -     TSH + Free T4  -     CBC and differential  -     hCG, quantitative    Other orders  -     Liquid-based pap, screening  -     Ferrous Sulfate ER (Slow Fe) 142 (45 Fe) MG TBCR; Take by mouth 3 (three) times a week  -     ascorbic acid (VITAMIN C) 500 mg tablet; Take 500 mg by mouth daily          Subjective:      Patient ID: Most Ralph Cole is a 38 y.o. female.    HPI Last seen 2021, overdue for pap    The following portions of the patient's history were reviewed and updated as appropriate: She  has a  "past medical history of Anemia, Hypothyroid, and PCOS (polycystic ovarian syndrome).  She   Patient Active Problem List    Diagnosis Date Noted    Menorrhagia with irregular cycle 02/02/2024    PCOS (polycystic ovarian syndrome) 07/22/2021    Infertility, female 07/09/2021    Fatigue 06/17/2021    DUB (dysfunctional uterine bleeding) 06/10/2021     She  has no past surgical history on file.  Her family history includes Hypertension in her father and mother; Stroke (age of onset: 50) in her mother.  She  reports that she has never smoked. She has never used smokeless tobacco. She reports that she does not drink alcohol and does not use drugs.  Current Outpatient Medications   Medication Sig Dispense Refill    ascorbic acid (VITAMIN C) 500 mg tablet Take 500 mg by mouth daily      Ferrous Sulfate ER (Slow Fe) 142 (45 Fe) MG TBCR Take by mouth 3 (three) times a week      Levothyroxine Sodium 50 MCG CAPS Take 2 capsules by mouth daily       No current facility-administered medications for this visit.     She has No Known Allergies..    Review of Systems  +menorrhagia +irregular menses +anemia +fatigue +premenstrual bloating +secondary infertility    Objective:      /56 (BP Location: Left arm, Patient Position: Sitting, Cuff Size: Standard)   Ht 5' 2\" (1.575 m)   Wt 65.5 kg (144 lb 6.4 oz)   LMP 12/16/2023 (Exact Date)   BMI 26.41 kg/m²          Physical Exam    Appears well, no apparent distress.   Does not appear anxious or depressed.  Pelvic: external visualization only. Right labial hyperpigmented skin tags, isolated left skin tag. Smooth epithelium, no condylomatous change  "

## 2024-02-02 ENCOUNTER — OFFICE VISIT (OUTPATIENT)
Dept: OBGYN CLINIC | Facility: CLINIC | Age: 39
End: 2024-02-02
Payer: COMMERCIAL

## 2024-02-02 VITALS
BODY MASS INDEX: 26.57 KG/M2 | DIASTOLIC BLOOD PRESSURE: 56 MMHG | SYSTOLIC BLOOD PRESSURE: 110 MMHG | HEIGHT: 62 IN | WEIGHT: 144.4 LBS

## 2024-02-02 DIAGNOSIS — N97.9 INFERTILITY, FEMALE: ICD-10-CM

## 2024-02-02 DIAGNOSIS — N92.1 MENORRHAGIA WITH IRREGULAR CYCLE: Primary | ICD-10-CM

## 2024-02-02 PROBLEM — D64.9 ANEMIA: Status: ACTIVE | Noted: 2024-02-02

## 2024-02-02 PROCEDURE — 99214 OFFICE O/P EST MOD 30 MIN: CPT | Performed by: OBSTETRICS & GYNECOLOGY

## 2024-02-02 RX ORDER — FERROUS SULFATE 137(45) MG
TABLET, EXTENDED RELEASE ORAL 3 TIMES WEEKLY
COMMUNITY

## 2024-02-02 RX ORDER — ASCORBIC ACID 500 MG
500 TABLET ORAL DAILY
COMMUNITY

## 2024-02-02 NOTE — PATIENT INSTRUCTIONS
Get your labs and when we see the results start the progesterone for five days to make the period come.    When the period starts, start the birth control pill.   We will see you back in 3 months.  Check with your insurance company as to your infertility benefits.  Once the cycles are under control we will refer you to the infertility doctors.    On the last day of the progesterone start to take ibuprofen/advil (with food and water) and continue that with your period because that can slow the blood flow. Take twice daily for 5 days starting before the period.6

## 2024-02-02 NOTE — ASSESSMENT & PLAN NOTE
37 yo  LMP 23 last seen 2021 at [facility] with DUB, menorrhagia, secondary infertility and PCOS.   Presents today with  (Most agreed to his  role) with similar complaints.   Irregular menses, heavy flow when occurs.   Has been seen by gyn in [facility] with cyclic progesterone therapy and offering of OCPs which they have declined.   Currently most concerned with cycle, menorrhagia, fatigue and anemia. Last blood work from 2023 reviewed.     Normal u/s reviewed from , normal day 3 LH FSH. PCOS labs. Failed letrozole attempt for conception in .     Will check TSH, CBC, HCG and start provera 5 x 5 days for cycle and then OCPs to follow.   R&B of OCPs reviewed, no contraindication.  IV iron may be indicated pending results.   Will recheck in 3 months with annual exam (overdue pap) and then refer to RE if still interested in pregnancy.     At the end of the consultation, Most requested exam of external vaginal skin tags. Will recheck in May at well check.

## 2024-02-02 NOTE — LETTER
2024     Pietro Jackson  [address] [facility] [address]    Patient: Most Ralph Cole   YOB: 1985   Date of Visit: 2024       Dear Dr. Jackson:    Thank you for referring Most Cole to me for evaluation. Below are my notes for this consultation.    If you have questions, please do not hesitate to call me. I look forward to following your patient along with you.         Sincerely,        Irlanda Ferris MD        CC: No Recipients    Irlanda Ferris MD  2024  9:30 AM  Sign when Signing Visit  Assessment/Plan:    Menorrhagia with irregular cycle  37 yo  LMP 23 last seen 2021 at [facility] with DUB, menorrhagia, secondary infertility and PCOS.   Presents today with  (Most agreed to his  role) with similar complaints.   Irregular menses, heavy flow when occurs.   Has been seen by gyn in [facility] with cyclic progesterone therapy and offering of OCPs which they have declined.   Currently most concerned with cycle, menorrhagia, fatigue and anemia. Last blood work from 2023 reviewed.     Normal u/s reviewed from , normal day 3 LH FSH. PCOS labs. Failed letrozole attempt for conception in .     Will check TSH, CBC, HCG and start provera 5 x 5 days for cycle and then OCPs to follow.   R&B of OCPs reviewed, no contraindication.  IV iron may be indicated pending results.   Will recheck in 3 months with annual exam (overdue pap) and then refer to RE if still interested in pregnancy.     At the end of the consultation, Most requested exam of external vaginal skin tags. Will recheck in May at well check.       Diagnoses and all orders for this visit:    Menorrhagia with irregular cycle  -     TSH + Free T4; Future  -     CBC and differential; Future  -     hCG, quantitative; Future  -     TSH + Free T4  -     CBC and differential  -     hCG, quantitative    Infertility, female  -     TSH + Free T4; Future  -     CBC and differential; Future  -     hCG,  "quantitative; Future  -     TSH + Free T4  -     CBC and differential  -     hCG, quantitative    Other orders  -     Liquid-based pap, screening  -     Ferrous Sulfate ER (Slow Fe) 142 (45 Fe) MG TBCR; Take by mouth 3 (three) times a week  -     ascorbic acid (VITAMIN C) 500 mg tablet; Take 500 mg by mouth daily          Subjective:      Patient ID: Most Ralph Cole is a 38 y.o. female.    HPI Last seen 8/2021, overdue for pap    The following portions of the patient's history were reviewed and updated as appropriate: She  has a past medical history of Anemia, Hypothyroid, and PCOS (polycystic ovarian syndrome).  She   Patient Active Problem List    Diagnosis Date Noted   • Menorrhagia with irregular cycle 02/02/2024   • PCOS (polycystic ovarian syndrome) 07/22/2021   • Infertility, female 07/09/2021   • Fatigue 06/17/2021   • DUB (dysfunctional uterine bleeding) 06/10/2021     She  has no past surgical history on file.  Her family history includes Hypertension in her father and mother; Stroke (age of onset: 50) in her mother.  She  reports that she has never smoked. She has never used smokeless tobacco. She reports that she does not drink alcohol and does not use drugs.  Current Outpatient Medications   Medication Sig Dispense Refill   • ascorbic acid (VITAMIN C) 500 mg tablet Take 500 mg by mouth daily     • Ferrous Sulfate ER (Slow Fe) 142 (45 Fe) MG TBCR Take by mouth 3 (three) times a week     • Levothyroxine Sodium 50 MCG CAPS Take 2 capsules by mouth daily       No current facility-administered medications for this visit.     She has No Known Allergies..    Review of Systems  +menorrhagia +irregular menses +anemia +fatigue +premenstrual bloating +secondary infertility    Objective:      /56 (BP Location: Left arm, Patient Position: Sitting, Cuff Size: Standard)   Ht 5' 2\" (1.575 m)   Wt 65.5 kg (144 lb 6.4 oz)   LMP 12/16/2023 (Exact Date)   BMI 26.41 kg/m²          Physical Exam    Appears well, " no apparent distress.   Does not appear anxious or depressed.  Pelvic: external visualization only. Right labial hyperpigmented skin tags, isolated left skin tag. Smooth epithelium, no condylomatous change

## 2024-02-08 ENCOUNTER — TELEPHONE (OUTPATIENT)
Dept: OBGYN CLINIC | Facility: CLINIC | Age: 39
End: 2024-02-08

## 2024-02-08 NOTE — TELEPHONE ENCOUNTER
Pt's  Oliver called on behalf of patient. Pt had blood work done at [facility] on 2/2/24 and is waiting for Dr. Ferris to provide further direction to start medication.   Results from 2/2/24 are still pending, spoke with [facility], TSHT4 are still pending, a finalized report will be sent once all blood work is completed.

## 2024-02-11 LAB
BASOPHILS # BLD AUTO: 0.1 X10E3/UL (ref 0–0.2)
BASOPHILS NFR BLD AUTO: 1 %
EOSINOPHIL # BLD AUTO: 0.2 X10E3/UL (ref 0–0.4)
EOSINOPHIL NFR BLD AUTO: 3 %
ERYTHROCYTE [DISTWIDTH] IN BLOOD BY AUTOMATED COUNT: 15 % (ref 11.7–15.4)
HCG INTACT+B SERPL-ACNC: <1 MIU/ML
HCT VFR BLD AUTO: 41.6 % (ref 34–46.6)
HGB BLD-MCNC: 12.9 G/DL (ref 11.1–15.9)
IMM GRANULOCYTES # BLD: 0 X10E3/UL (ref 0–0.1)
IMM GRANULOCYTES NFR BLD: 0 %
LYMPHOCYTES # BLD AUTO: 3.3 X10E3/UL (ref 0.7–3.1)
LYMPHOCYTES NFR BLD AUTO: 38 %
MCH RBC QN AUTO: 22.6 PG (ref 26.6–33)
MCHC RBC AUTO-ENTMCNC: 31 G/DL (ref 31.5–35.7)
MCV RBC AUTO: 73 FL (ref 79–97)
MONOCYTES # BLD AUTO: 0.4 X10E3/UL (ref 0.1–0.9)
MONOCYTES NFR BLD AUTO: 4 %
NEUTROPHILS # BLD AUTO: 4.6 X10E3/UL (ref 1.4–7)
NEUTROPHILS NFR BLD AUTO: 54 %
PLATELET # BLD AUTO: 217 X10E3/UL (ref 150–450)
RBC # BLD AUTO: 5.71 X10E6/UL (ref 3.77–5.28)
T4 FREE SERPL DIALY-MCNC: 1.7 NG/DL
TSH SERPL-ACNC: 1.2 UU/ML
WBC # BLD AUTO: 8.5 X10E3/UL (ref 3.4–10.8)

## 2024-02-12 ENCOUNTER — TELEPHONE (OUTPATIENT)
Dept: OBGYN CLINIC | Facility: CLINIC | Age: 39
End: 2024-02-12

## 2024-02-12 DIAGNOSIS — N92.1 MENORRHAGIA WITH IRREGULAR CYCLE: Primary | ICD-10-CM

## 2024-02-12 RX ORDER — MEDROXYPROGESTERONE ACETATE 5 MG/1
5 TABLET ORAL DAILY
Qty: 5 TABLET | Refills: 0 | Status: SHIPPED | OUTPATIENT
Start: 2024-02-12

## 2024-02-12 RX ORDER — NORGESTIMATE AND ETHINYL ESTRADIOL 0.25-0.035
1 KIT ORAL DAILY
Qty: 28 TABLET | Refills: 3 | Status: SHIPPED | OUTPATIENT
Start: 2024-02-12

## 2024-02-12 NOTE — TELEPHONE ENCOUNTER
Please call  with normal labs - normal thyroid, not anemic, HCG neg.   Provera course to pharmacy, can start for 5 days. When period starts can start OCPs.   RTO as scheduled 5/31/24.

## 2024-02-14 NOTE — TELEPHONE ENCOUNTER
Spoke with pts  as to normal lab testing results, how & when to take Provera and birth control pills. He verbalized understanding.

## 2024-03-07 ENCOUNTER — TELEPHONE (OUTPATIENT)
Age: 39
End: 2024-03-07

## 2024-03-07 NOTE — TELEPHONE ENCOUNTER
She should be on the OCPs now after the provera course mid February.  She can take ibuprofen 400 mg twice daily as needed for cramping.   Slow fe during her cycle is fine. She was not anemic 2/2/24.   Thanks.

## 2024-03-07 NOTE — TELEPHONE ENCOUNTER
Patients  called and asked if she can take slow fe and also asked can she take one ibuprofen or 2 daily.  He is available by phone until 11 am  and he said if you call after to please leave a message on voice mail at [phone] .  Thank you

## 2024-03-20 ENCOUNTER — TELEPHONE (OUTPATIENT)
Age: 39
End: 2024-03-20

## 2024-03-20 NOTE — TELEPHONE ENCOUNTER
I returned call to pt's .  He states the PCP recommended she take po iron and he wanted to make sure it's okay to take with her birth control pills.  I said it is not a problem.  She also takes thyroid medicine and I told him she should not take the iron or birth control pills with the thyroid medicine.  He said she takes the thyroid medicine in the morning and others at night.  I told him that is fine.

## 2024-03-20 NOTE — TELEPHONE ENCOUNTER
Patient's  had called regarding a medication question. He had asked if patient can take the Iron with another medication, I was unable to get the name of the medication. Patient's  asked to be called between 2:30-4pm, he can be reached at [phone].

## 2024-03-31 ENCOUNTER — NURSE TRIAGE (OUTPATIENT)
Dept: OTHER | Facility: OTHER | Age: 39
End: 2024-03-31

## 2024-04-01 NOTE — TELEPHONE ENCOUNTER
"Reason for Disposition  • Menstrual period, missed or late    Answer Assessment - Initial Assessment Questions  1. TYPE: \"What is the name of the birth control pill you are using?\"      norgestimate-ethinyl estradiol   2. PACK TYPE: \"How do you take your birth control pill?\"    - 28-Day Cycle/Pack: Takes an active hormone pill days 1-21 and placebo pill on days 21-28    - 28-Day Cycle/21-Day Pack: Takes an active hormone pill days 1-21, followed by a pill-free week    - 3-Month Cycle/Pack: Takes an active pill for 3 months, followed by pill free week or placebo week    - Continuous: Takes an active hormone pill every day, continuously      28 days -one per day.  3. START DATE: \"When did you first start taking this birth control pill?\"      3/6/2024  4. SYMPTOM: \"What is the main symptom (or question) you're concerned about?\"      Some spotting tonight -light vaginal bleeding.  is questioning if she should take the last three pills.  5. ONSET: \"When did the spotting start?\"      This evening  6. VAGINAL BLEEDING: \"Are you having any unusual vaginal bleeding?\"    - NONE: no bleeding    - SPOTTING: spotting or pinkish / brownish mucous discharge; does not fill panty-liner or pad    - MILD: less than 1 pad / hour; less than patient's usual menstrual bleeding    - MODERATE: 1-2 pads / hour; small-medium blood clots (e.g., pea, grape, small coin)    - SEVERE: soaking 2 or more pads/hour for 2 or more hours; bleeding not contained by pads or   tampons      spotting  7. ABDOMEN OR PELVIC PAIN: \"Are you have any pain in your abdomen or pelvic area?\" (Scale: 0, 1-10; none, mild, moderate, severe)    - NONE (0): no pain    - MILD (1-3): doesn't interfere with normal activities, abdomen soft and not tender to touch     - MODERATE (4-7): interferes with normal activities or awakens from sleep, tender to touch     - SEVERE (8-10): excruciating pain, doubled over, unable to do any normal activities        denies  8. " "MISSED/LATE PILLS: \"Did you miss or take any pills late?\" If Yes, ask: \"When? How many pills?\"    - NOTE: Pill is considered late/missed if taken more than 3 hours later than scheduled time.      No missed pills. Took two pills together at the beginning.  9. PREGNANCY: \"Are you concerned you might be pregnant?\" \"When was your last menstrual   period?\" No    Protocols used: Contraception - Birth Control Pills - Combined-ADULT-AH    "

## 2024-04-01 NOTE — TELEPHONE ENCOUNTER
"Regarding: unresolved back pain  ----- Message from Yisel Marinelli sent at 3/31/2024  8:13 PM EDT -----  \" My wife was prescribed medication for back pain. She is out of the medication and the pain has not gone away. \"    The above is not what the  was calling about. He was asking about patient's BCP.  "

## 2024-04-02 NOTE — TELEPHONE ENCOUNTER
Spouse returned call and reported his wife started the OCP last month. Just finishing first pack. She did miss a pill yesterday.  Due to start new pack this week.  Concerned about her anemia and heavy bleeding during menses. Saw Dr Ferris for the OCP which he thought would lessen her bleeding.Takes iron supplements three times per week. He reports last night while she was sleeping he feels she was short of breath but did not wake her.  Advised last CBC 2/2024 hemoglobin was normal. Reassured-advised may take up to 3 months to notice change in menstrual flow. Encouraged diligence with pill taking. Advised if missed one pill can cause breakthrough bleeding or spotting.  Should take missed pill as soon as remembers or call office for instructions.  Advised if wife develops shortness of breath, dizziness or lightheadedness proceed to ED for evaluation. Call back for additional recommendations or concerns.  in agreement to plan

## 2024-05-30 NOTE — PROGRESS NOTES
Assessment/Plan:    Encounter for gynecological examination (general) (routine) without abnormal findings  Doing well with the pill, on for 3 months with regular, lighter cycles.   No sig side effects, initial weakness and breast tenderness resolved.   To [facility] for 2 months, will continue pills at this time.   May attempt conception prior to next visit.     No breast or gyn complaints.   Normal breast and pelvic exams. Pap done, last 2018 neg/HPV neg.   Will refer to RE after three months if not successful.       Diagnoses and all orders for this visit:    Encounter for gynecological examination (general) (routine) without abnormal findings    Screening for malignant neoplasm of the cervix  -     IGP, Aptima HPV, Rfx 16/18,45    Menorrhagia with irregular cycle  -     norgestimate-ethinyl estradiol (Meeta) 0.25-35 MG-MCG per tablet; Take 1 tablet by mouth daily          Subjective:      Patient ID: Most Ralph Cole is a 38 y.o. female.    HPI Here for well check and f/u from 2/2024 OCP initiation.    The following portions of the patient's history were reviewed and updated as appropriate: She  has a past medical history of Anemia, Hypothyroid, and PCOS (polycystic ovarian syndrome).  She   Patient Active Problem List    Diagnosis Date Noted    Encounter for gynecological examination (general) (routine) without abnormal findings 05/31/2024    Menorrhagia with irregular cycle 02/02/2024    Anemia 02/02/2024    PCOS (polycystic ovarian syndrome) 07/22/2021    Infertility, female 07/09/2021    Fatigue 06/17/2021     She  has no past surgical history on file.  Her family history includes Hypertension in her father and mother; Stroke (age of onset: 50) in her mother.  She  reports that she has never smoked. She has never used smokeless tobacco. She reports that she does not drink alcohol and does not use drugs.  Current Outpatient Medications   Medication Sig Dispense Refill    ascorbic acid (VITAMIN C) 500 mg  "tablet Take 500 mg by mouth daily      Ferrous Sulfate ER (Slow Fe) 142 (45 Fe) MG TBCR Take by mouth 3 (three) times a week      Levothyroxine Sodium 50 MCG CAPS Take 2 capsules by mouth daily Taking 2 tablets 5 days per week, 1 tablet 2 days per week.      norgestimate-ethinyl estradiol (Meeta) 0.25-35 MG-MCG per tablet Take 1 tablet by mouth daily 84 tablet 3     No current facility-administered medications for this visit.     She has No Known Allergies..    Review of Systems  No headaches, weight gain or loss, nausea/vomiting/bowel changes, abdominal pain, irregular bleeding, heavy menses, painful menses, leg pain/swelling/erythema. No adverse mood effects      Objective:      /56 (BP Location: Left arm, Patient Position: Sitting, Cuff Size: Standard)   Ht 5' 2\" (1.575 m)   Wt 64.7 kg (142 lb 9.6 oz)   LMP 05/26/2024   BMI 26.08 kg/m²          Physical Exam    General appearance: no distress, pleasant  Neck: thyroid without nodules or thyromegaly, no palpable adenopathy  Lymph nodes: no palpable adenopathy  Breasts: no masses, nodes or skin changes  Abdomen: soft, non tender, no palpable masses  Pelvic exam: normal external genitalia with unchanged small right labia skin tags without condylomatous change, urethral meatus normal, vagina without lesions, cervix without lesions, uterus small, non tender, no adnexal masses, non tender  Rectal exam: deferred    "

## 2024-05-31 ENCOUNTER — ANNUAL EXAM (OUTPATIENT)
Dept: OBGYN CLINIC | Facility: CLINIC | Age: 39
End: 2024-05-31
Payer: COMMERCIAL

## 2024-05-31 VITALS
SYSTOLIC BLOOD PRESSURE: 112 MMHG | BODY MASS INDEX: 26.24 KG/M2 | DIASTOLIC BLOOD PRESSURE: 56 MMHG | WEIGHT: 142.6 LBS | HEIGHT: 62 IN

## 2024-05-31 DIAGNOSIS — Z01.419 ENCOUNTER FOR GYNECOLOGICAL EXAMINATION (GENERAL) (ROUTINE) WITHOUT ABNORMAL FINDINGS: Primary | ICD-10-CM

## 2024-05-31 DIAGNOSIS — Z12.4 SCREENING FOR MALIGNANT NEOPLASM OF THE CERVIX: ICD-10-CM

## 2024-05-31 DIAGNOSIS — N92.1 MENORRHAGIA WITH IRREGULAR CYCLE: ICD-10-CM

## 2024-05-31 PROBLEM — N93.8 DUB (DYSFUNCTIONAL UTERINE BLEEDING): Status: RESOLVED | Noted: 2021-06-10 | Resolved: 2024-05-31

## 2024-05-31 PROCEDURE — S0612 ANNUAL GYNECOLOGICAL EXAMINA: HCPCS | Performed by: OBSTETRICS & GYNECOLOGY

## 2024-05-31 RX ORDER — NORGESTIMATE AND ETHINYL ESTRADIOL 0.25-0.035
1 KIT ORAL DAILY
Qty: 84 TABLET | Refills: 3 | Status: SHIPPED | OUTPATIENT
Start: 2024-05-31

## 2024-05-31 NOTE — PATIENT INSTRUCTIONS
When you are ready to try for a baby, stop the pill and take a prenatal vitamin.   If you do not get pregnant in 3 months, then call and we will give you the number for the infertility doctors.     The ovulation predictor kit when you are trying to get pregnant and start on day 10 until it turns (usually on day 14-16). Day one is the first day of your menstruation.

## 2024-05-31 NOTE — LETTER
May 31, 2024     Pietro Jackson  [address] [address]    Patient: Most Ralph Cole   YOB: 1985   Date of Visit: 5/31/2024       Dear Dr. Jackson:    Most Cole was in today for her annual gyn exam. Below are my notes for this visit.    If you have questions, please do not hesitate to call me. I look forward to following your patient along with you.         Sincerely,        Irlanda Ferris MD        CC: No Recipients    Irlanda Ferris MD  5/31/2024  9:36 AM  Sign when Signing Visit  Assessment/Plan:    Encounter for gynecological examination (general) (routine) without abnormal findings  Doing well with the pill, on for 3 months with regular, lighter cycles.   No sig side effects, initial weakness and breast tenderness resolved.   To [place] for 2 months, will continue pills at this time.   May attempt conception prior to next visit.     No breast or gyn complaints.   Normal breast and pelvic exams. Pap done, last 2018 neg/HPV neg.   Will refer to RE after three months if not successful.       Diagnoses and all orders for this visit:    Encounter for gynecological examination (general) (routine) without abnormal findings    Screening for malignant neoplasm of the cervix  -     IGP, Aptima HPV, Rfx 16/18,45    Menorrhagia with irregular cycle  -     norgestimate-ethinyl estradiol (Meeta) 0.25-35 MG-MCG per tablet; Take 1 tablet by mouth daily          Subjective:      Patient ID: Most Ralph Cole is a 38 y.o. female.    HPI Here for well check and f/u from 2/2024 OCP initiation.    The following portions of the patient's history were reviewed and updated as appropriate: She  has a past medical history of Anemia, Hypothyroid, and PCOS (polycystic ovarian syndrome).  She   Patient Active Problem List    Diagnosis Date Noted   • Encounter for gynecological examination (general) (routine) without abnormal findings 05/31/2024   • Menorrhagia with irregular cycle 02/02/2024   • Anemia  "02/02/2024   • PCOS (polycystic ovarian syndrome) 07/22/2021   • Infertility, female 07/09/2021   • Fatigue 06/17/2021     She  has no past surgical history on file.  Her family history includes Hypertension in her father and mother; Stroke (age of onset: 50) in her mother.  She  reports that she has never smoked. She has never used smokeless tobacco. She reports that she does not drink alcohol and does not use drugs.  Current Outpatient Medications   Medication Sig Dispense Refill   • ascorbic acid (VITAMIN C) 500 mg tablet Take 500 mg by mouth daily     • Ferrous Sulfate ER (Slow Fe) 142 (45 Fe) MG TBCR Take by mouth 3 (three) times a week     • Levothyroxine Sodium 50 MCG CAPS Take 2 capsules by mouth daily Taking 2 tablets 5 days per week, 1 tablet 2 days per week.     • norgestimate-ethinyl estradiol (Meeta) 0.25-35 MG-MCG per tablet Take 1 tablet by mouth daily 84 tablet 3     No current facility-administered medications for this visit.     She has No Known Allergies..    Review of Systems  No headaches, weight gain or loss, nausea/vomiting/bowel changes, abdominal pain, irregular bleeding, heavy menses, painful menses, leg pain/swelling/erythema. No adverse mood effects      Objective:      /56 (BP Location: Left arm, Patient Position: Sitting, Cuff Size: Standard)   Ht 5' 2\" (1.575 m)   Wt 64.7 kg (142 lb 9.6 oz)   LMP 05/26/2024   BMI 26.08 kg/m²          Physical Exam    General appearance: no distress, pleasant  Neck: thyroid without nodules or thyromegaly, no palpable adenopathy  Lymph nodes: no palpable adenopathy  Breasts: no masses, nodes or skin changes  Abdomen: soft, non tender, no palpable masses  Pelvic exam: normal external genitalia with unchanged small right labia skin tags without condylomatous change, urethral meatus normal, vagina without lesions, cervix without lesions, uterus small, non tender, no adnexal masses, non tender  Rectal exam: deferred    "

## 2024-05-31 NOTE — ASSESSMENT & PLAN NOTE
Doing well with the pill, on for 3 months with regular, lighter cycles.   No sig side effects, initial weakness and breast tenderness resolved.   To [facility] for 2 months, will continue pills at this time.   May attempt conception prior to next visit.     No breast or gyn complaints.   Normal breast and pelvic exams. Pap done, last 2018 neg/HPV neg.   Will refer to RE after three months if not successful.

## 2024-06-05 LAB
CYTOLOGIST CVX/VAG CYTO: NORMAL
DX ICD CODE: NORMAL
HPV GENOTYPE REFLEX: NORMAL
HPV I/H RISK 4 DNA CVX QL PROBE+SIG AMP: NEGATIVE
OTHER STN SPEC: NORMAL
PATH REPORT.FINAL DX SPEC: NORMAL
SL AMB NOTE:: NORMAL
SL AMB SPECIMEN ADEQUACY: NORMAL
SL AMB TEST METHODOLOGY: NORMAL

## 2024-06-30 PROBLEM — Z01.419 ENCOUNTER FOR GYNECOLOGICAL EXAMINATION (GENERAL) (ROUTINE) WITHOUT ABNORMAL FINDINGS: Status: RESOLVED | Noted: 2024-05-31 | Resolved: 2024-06-30

## 2025-06-14 NOTE — ASSESSMENT & PLAN NOTE
On OCPs, normal w/d bleed. When off skips cycles.   Off for Ramadan, skipped and spontaneous cycle 5/17/25. Now back on OCPs.     Daughter 14, interested in conception. No contraception for over 10 years without pregnancy.   Recommend RE referral, placed.   PNVs recommended    C/o chronic fatigue. Per  PCP blood work normal, no further follow up scheduled.   Recommend consideration of rheumatology evaluation for chronic fatigue, # given.    Normal breast and pelvic exams.   Last pap 05/31/2024 neg/HPV neg; due 2029   RTO one year, prn

## 2025-06-14 NOTE — PROGRESS NOTES
Name: Most Ralph Cole      : 1985      MRN: [id number]  Encounter Provider: Irlanda Ferris MD  Encounter Date: 2025   Encounter department: [facility] [place]  :  Assessment & Plan  Encounter for gynecological examination (general) (routine) without abnormal findings  On OCPs, normal w/d bleed. When off skips cycles.   Off for Ramadan, skipped and spontaneous cycle 25. Now back on OCPs.     Daughter 14, interested in conception. No contraception for over 10 years without pregnancy.   Recommend RE referral, placed.   PNVs recommended    C/o chronic fatigue. Per  PCP blood work normal, no further follow up scheduled.   Recommend consideration of rheumatology evaluation for chronic fatigue, # given.    Normal breast and pelvic exams.   Last pap 2024 neg/HPV neg; due    RTO one year, prn       Secondary anovulatory infertility    Orders:    Ambulatory Referral to Infertility; Future    PCOS (polycystic ovarian syndrome)    Orders:    Ambulatory Referral to Infertility; Future        History of Present Illness   HPI  Most Ralph Cole is a 39 y.o. female who presents for well check    Review of Systems  No breast, bladder, bowel issues.   Irregular cycles off OCPs.     Past Medical History   Past Medical History[1]  Past Surgical History[2]  Family History[3]   reports that she has never smoked. She has never used smokeless tobacco. She reports that she does not drink alcohol and does not use drugs.  Current Outpatient Medications   Medication Instructions    ascorbic acid (VITAMIN C) 500 mg, Daily    ergocalciferol (VITAMIN D2) 50,000 units TAKE 1 CAPSULE BY MOUTH EVERY WEEK FOR 4 WEEKS THAN TAKE OVER THE COUNTER VITAMIN D 3 1000 UNITS DAILY    Ferrous Sulfate ER (Slow Fe) 142 (45 Fe) MG TBCR 3 times weekly    levothyroxine 112 mcg tablet 1 tablet, Daily    norgestimate-ethinyl estradiol (Meeta) 0.25-35 MG-MCG per tablet 1 tablet, Oral, Daily   Allergies[4]  "     Objective   /62 (BP Location: Left arm, Patient Position: Sitting, Cuff Size: Large)   Ht 5' 2\" (1.575 m)   Wt 67.6 kg (149 lb)   LMP 05/17/2025 (Exact Date)   BMI 27.25 kg/m²      Physical Exam  General appearance: no distress, pleasant  Neck: thyroid without nodules or thyromegaly, no palpable adenopathy  Lymph nodes: no palpable adenopathy  Breasts: no masses, nodes or skin changes  Abdomen: soft, non tender, no palpable masses  Pelvic exam: normal external genitalia, urethral meatus normal, vagina without lesions, cervix without lesions, uterus small, non tender, no adnexal masses, non tender  Rectal exam: deferred       [1]   Past Medical History:  Diagnosis Date    Anemia     Hypothyroid     PCOS (polycystic ovarian syndrome)    [2] No past surgical history on file.  [3]   Family History  Problem Relation Name Age of Onset    Hypertension Mother      Stroke Mother  50    Hypertension Father Shirind Ramin Wyatt     Breast cancer Neg Hx      Uterine cancer Neg Hx      Ovarian cancer Neg Hx      Colon cancer Neg Hx     [4] No Known Allergies    "

## 2025-06-17 ENCOUNTER — ANNUAL EXAM (OUTPATIENT)
Dept: OBGYN CLINIC | Facility: CLINIC | Age: 40
End: 2025-06-17
Payer: COMMERCIAL

## 2025-06-17 VITALS
WEIGHT: 149 LBS | HEIGHT: 62 IN | DIASTOLIC BLOOD PRESSURE: 62 MMHG | SYSTOLIC BLOOD PRESSURE: 118 MMHG | BODY MASS INDEX: 27.42 KG/M2

## 2025-06-17 DIAGNOSIS — Z01.419 ENCOUNTER FOR GYNECOLOGICAL EXAMINATION (GENERAL) (ROUTINE) WITHOUT ABNORMAL FINDINGS: Primary | ICD-10-CM

## 2025-06-17 DIAGNOSIS — N97.0 SECONDARY ANOVULATORY INFERTILITY: ICD-10-CM

## 2025-06-17 DIAGNOSIS — E28.2 PCOS (POLYCYSTIC OVARIAN SYNDROME): ICD-10-CM

## 2025-06-17 PROCEDURE — S0612 ANNUAL GYNECOLOGICAL EXAMINA: HCPCS | Performed by: OBSTETRICS & GYNECOLOGY

## 2025-06-17 RX ORDER — LEVOTHYROXINE SODIUM 112 UG/1
1 TABLET ORAL DAILY
COMMUNITY
Start: 2025-05-16

## 2025-06-17 RX ORDER — ERGOCALCIFEROL 1.25 MG/1
CAPSULE, LIQUID FILLED ORAL
COMMUNITY
Start: 2025-05-19

## 2025-06-17 NOTE — PATIENT INSTRUCTIONS
[facility] - call for appointment due to fatigue and consultation  [address]  [address]  [place] [address] [phone]    [facility] - call for appointment to help with getting pregnant  Tara Budinetz, DO  [address]  [address]  [place] [address]    Start a prenatal vitamin that you can get over the counter

## 2025-06-17 NOTE — LETTER
2025     Raina Root DO  [address] [address]    Patient: Most Ralph Cole   YOB: 1985   Date of Visit: 2025       Dear Dr. Raina Root DO:    Most Cole was in today for her annual gyn exam. Below are my notes for this visit.    If you have questions, please do not hesitate to call me. I look forward to following your patient along with you.         Sincerely,        Irlanda Ferris MD        CC: No Recipients    Irlanda Ferris MD  2025  3:13 PM  Sign when Signing Visit  Name: Most Ralph Cole      : 1985      MRN: [id number]  Encounter Provider: Irlanda Ferris MD  Encounter Date: 2025   Encounter department: [facility] [place]  :  Assessment & Plan  Encounter for gynecological examination (general) (routine) without abnormal findings  On OCPs, normal w/d bleed. When off skips cycles.   Off for Ramadan, skipped and spontaneous cycle 25. Now back on OCPs.     Daughter 14, interested in conception. No contraception for over 10 years without pregnancy.   Recommend RE referral, placed.   PNVs recommended    C/o chronic fatigue. Per  PCP blood work normal, no further follow up scheduled.   Recommend consideration of rheumatology evaluation for chronic fatigue, # given.    Normal breast and pelvic exams.   Last pap 2024 neg/HPV neg; due    RTO one year, prn       Secondary anovulatory infertility    Orders:  •  Ambulatory Referral to Infertility; Future    PCOS (polycystic ovarian syndrome)    Orders:  •  Ambulatory Referral to Infertility; Future        History of Present Illness  HPI  Most Ralph Cole is a 39 y.o. female who presents for well check    Review of Systems  No breast, bladder, bowel issues.   Irregular cycles off OCPs.     Past Medical History  Past Medical History[1]  Past Surgical History[2]  Family History[3]   reports that she has never smoked. She has never used smokeless tobacco. She  "reports that she does not drink alcohol and does not use drugs.  Current Outpatient Medications   Medication Instructions   • ascorbic acid (VITAMIN C) 500 mg, Daily   • ergocalciferol (VITAMIN D2) 50,000 units TAKE 1 CAPSULE BY MOUTH EVERY WEEK FOR 4 WEEKS THAN TAKE OVER THE COUNTER VITAMIN D 3 1000 UNITS DAILY   • Ferrous Sulfate ER (Slow Fe) 142 (45 Fe) MG TBCR 3 times weekly   • levothyroxine 112 mcg tablet 1 tablet, Daily   • norgestimate-ethinyl estradiol (Meeta) 0.25-35 MG-MCG per tablet 1 tablet, Oral, Daily   Allergies[4]      Objective  /62 (BP Location: Left arm, Patient Position: Sitting, Cuff Size: Large)   Ht 5' 2\" (1.575 m)   Wt 67.6 kg (149 lb)   LMP 05/17/2025 (Exact Date)   BMI 27.25 kg/m²      Physical Exam  General appearance: no distress, pleasant  Neck: thyroid without nodules or thyromegaly, no palpable adenopathy  Lymph nodes: no palpable adenopathy  Breasts: no masses, nodes or skin changes  Abdomen: soft, non tender, no palpable masses  Pelvic exam: normal external genitalia, urethral meatus normal, vagina without lesions, cervix without lesions, uterus small, non tender, no adnexal masses, non tender  Rectal exam: deferred       [1]   Past Medical History:  Diagnosis Date   • Anemia    • Hypothyroid    • PCOS (polycystic ovarian syndrome)    [2] No past surgical history on file.  [3]   Family History  Problem Relation Name Age of Onset   • Hypertension Mother     • Stroke Mother  50   • Hypertension Father Hermann Davishans Wyatt    • Breast cancer Neg Hx     • Uterine cancer Neg Hx     • Ovarian cancer Neg Hx     • Colon cancer Neg Hx     [4] No Known Allergies    "